# Patient Record
Sex: FEMALE | Race: WHITE | NOT HISPANIC OR LATINO | URBAN - METROPOLITAN AREA
[De-identification: names, ages, dates, MRNs, and addresses within clinical notes are randomized per-mention and may not be internally consistent; named-entity substitution may affect disease eponyms.]

---

## 2024-03-11 ENCOUNTER — OFFICE VISIT (OUTPATIENT)
Dept: URGENT CARE | Facility: CLINIC | Age: 49
End: 2024-03-11
Payer: COMMERCIAL

## 2024-03-11 VITALS
DIASTOLIC BLOOD PRESSURE: 62 MMHG | TEMPERATURE: 98 F | SYSTOLIC BLOOD PRESSURE: 109 MMHG | WEIGHT: 120 LBS | HEIGHT: 59 IN | RESPIRATION RATE: 14 BRPM | BODY MASS INDEX: 24.19 KG/M2 | OXYGEN SATURATION: 98 % | HEART RATE: 74 BPM

## 2024-03-11 DIAGNOSIS — Z77.098 EXPOSURE TO CHEMICAL INHALATION: Primary | ICD-10-CM

## 2024-03-11 PROCEDURE — 99204 OFFICE O/P NEW MOD 45 MIN: CPT | Performed by: PHYSICIAN ASSISTANT

## 2024-03-11 RX ORDER — LEVOTHYROXINE SODIUM 150 UG/1
150 TABLET ORAL DAILY
COMMUNITY
Start: 2023-11-27

## 2024-03-11 RX ORDER — BUPROPION HYDROCHLORIDE 150 MG/1
150 TABLET ORAL EVERY MORNING
COMMUNITY
Start: 2023-12-07

## 2024-03-11 RX ORDER — SERTRALINE HYDROCHLORIDE 50 MG/1
50 TABLET, FILM COATED ORAL DAILY
COMMUNITY
Start: 2024-02-17

## 2024-03-11 RX ORDER — TRAZODONE HYDROCHLORIDE 50 MG/1
50 TABLET ORAL NIGHTLY
COMMUNITY
Start: 2024-02-23

## 2024-03-11 ASSESSMENT — ENCOUNTER SYMPTOMS
CHEST TIGHTNESS: 1
FEVER: 0
FATIGUE: 0
SHORTNESS OF BREATH: 0

## 2024-03-11 ASSESSMENT — PAIN SCALES - GENERAL: PAINLEVEL: 1

## 2024-03-11 NOTE — PROGRESS NOTES
"Subjective   Patient ID: Audrey Dozier is a 48 y.o. female who presents for Chest Pain (\"Burning in lungs\" after boiling vinegar at home yesterday.).  Patient notes an irritated sensation when she is breathing.  Notes that symptoms are actually better than they were last night.  Concerned about possible injury given the exposure to the vinegar fumes.    Review of Systems   Constitutional:  Negative for fatigue and fever.   HENT:  Negative for congestion.    Respiratory:  Positive for chest tightness. Negative for shortness of breath.    Cardiovascular:  Negative for chest pain.   All other systems reviewed and are negative.      Objective   /62   Pulse 74   Temp 36.7 °C (98 °F) (Temporal)   Resp 14   Ht 1.499 m (4' 11\")   Wt 54.4 kg (120 lb)   LMP 02/15/2024 (Approximate)   SpO2 98%   BMI 24.24 kg/m²   Physical Exam  Constitutional:       General: She is not in acute distress.     Appearance: Normal appearance. She is not ill-appearing, toxic-appearing or diaphoretic.   HENT:      Head: Normocephalic and atraumatic.      Mouth/Throat:      Mouth: Mucous membranes are moist.      Pharynx: Oropharynx is clear.   Eyes:      Conjunctiva/sclera: Conjunctivae normal.   Cardiovascular:      Rate and Rhythm: Normal rate and regular rhythm.      Heart sounds: No murmur heard.  Pulmonary:      Effort: Pulmonary effort is normal. No respiratory distress.      Breath sounds: Normal breath sounds. No wheezing.   Musculoskeletal:         General: No swelling, tenderness, deformity or signs of injury. Normal range of motion.      Cervical back: Normal range of motion and neck supple.   Skin:     General: Skin is warm and dry.      Findings: No erythema or rash.   Neurological:      General: No focal deficit present.      Mental Status: She is alert and oriented to person, place, and time.      Gait: Gait normal.         Assessment/Plan   Problem List Items Addressed This Visit       Exposure to chemical " inhalation - Primary   Patient exposed to fumes of white vinegar at home yesterday.  On physical exam airways are clear pulse oximetry 98% no tachycardia no tachypnea.  Discussed with patient unlikely significant pneumonitis.  Likely some mild inflammation of the airway.  Recommending long steamy showers, cool-mist humidifier in the room when she is sleeping.  If there is any worsening do not hesitate to seek reevaluation but the amount of exposure is unlikely to cause any significant complications.  Discussed with patient in the future ventilating the area fully and perhaps even considering not boiling the vinegar as the acid itself will work without need of boiling if given time. Patient in no respiraotry distress today recommending monitoring for any worsening

## 2024-03-11 NOTE — PATIENT INSTRUCTIONS
Assessment/Plan   Problem List Items Addressed This Visit       Exposure to chemical inhalation - Primary   Patient exposed to fumes of white vinegar at home yesterday.  On physical exam airways are clear pulse oximetry 98% no tachycardia no tachypnea.  Discussed with patient unlikely significant pneumonitis.  Likely some mild inflammation of the airway.  Recommending long steamy showers, cool-mist humidifier in the room when she is sleeping.  If there is any worsening do not hesitate to seek reevaluation but the amount of exposure is unlikely to cause any significant complications.  Discussed with patient in the future ventilating the area fully and perhaps even considering not boiling the vinegar as the acid itself will work without need of boiling if given time

## 2025-01-03 ENCOUNTER — OFFICE VISIT (OUTPATIENT)
Dept: URGENT CARE | Age: 50
End: 2025-01-03
Payer: COMMERCIAL

## 2025-01-03 VITALS
DIASTOLIC BLOOD PRESSURE: 74 MMHG | OXYGEN SATURATION: 97 % | TEMPERATURE: 98.4 F | HEART RATE: 78 BPM | SYSTOLIC BLOOD PRESSURE: 107 MMHG | RESPIRATION RATE: 17 BRPM

## 2025-01-03 DIAGNOSIS — J32.0 RIGHT MAXILLARY SINUSITIS: ICD-10-CM

## 2025-01-03 DIAGNOSIS — J10.1 INFLUENZA A: Primary | ICD-10-CM

## 2025-01-03 DIAGNOSIS — R05.9 COUGH, UNSPECIFIED TYPE: ICD-10-CM

## 2025-01-03 LAB
POC RAPID INFLUENZA A: POSITIVE
POC RAPID INFLUENZA B: NEGATIVE
POC SARS-COV-2 AG BINAX: NORMAL

## 2025-01-03 RX ORDER — AMOXICILLIN AND CLAVULANATE POTASSIUM 875; 125 MG/1; MG/1
875 TABLET, FILM COATED ORAL 2 TIMES DAILY
Qty: 14 TABLET | Refills: 0 | Status: SHIPPED | OUTPATIENT
Start: 2025-01-03 | End: 2025-01-10

## 2025-01-03 RX ORDER — PROMETHAZINE HYDROCHLORIDE AND DEXTROMETHORPHAN HYDROBROMIDE 6.25; 15 MG/5ML; MG/5ML
5 SYRUP ORAL 4 TIMES DAILY PRN
Qty: 118 ML | Refills: 0 | Status: SHIPPED | OUTPATIENT
Start: 2025-01-03 | End: 2025-01-10

## 2025-01-03 ASSESSMENT — ENCOUNTER SYMPTOMS
CHILLS: 0
SHORTNESS OF BREATH: 0
ADENOPATHY: 0
COUGH: 1
HEADACHES: 0
FEVER: 0
CONFUSION: 0
ARTHRALGIAS: 0
WHEEZING: 0
SINUS PRESSURE: 1
NAUSEA: 0
VOMITING: 1
APNEA: 0
RHINORRHEA: 1
DIZZINESS: 0
PALPITATIONS: 0
SINUS PAIN: 1
CHOKING: 0
CHEST TIGHTNESS: 0
SORE THROAT: 1

## 2025-01-03 ASSESSMENT — PATIENT HEALTH QUESTIONNAIRE - PHQ9
2. FEELING DOWN, DEPRESSED OR HOPELESS: NOT AT ALL
1. LITTLE INTEREST OR PLEASURE IN DOING THINGS: NOT AT ALL
SUM OF ALL RESPONSES TO PHQ9 QUESTIONS 1 AND 2: 0

## 2025-01-03 NOTE — PATIENT INSTRUCTIONS
49-year-old with flu symptoms 5 days, now with maxillary sinusitis, treatment with Augmentin.  Persistent postviral cough recommend Promethazine DM.  Please continue with Tylenol or ibuprofen as needed for symptom control.

## 2025-01-03 NOTE — PROGRESS NOTES
"Subjective   Patient ID: Audrey Dozier \"Natalee" is a 49 y.o. female. They present today with a chief complaint of Vomiting, Nausea, sinus pressure, Nasal Congestion, Sore Throat, Cough, and chest congestion (X5 days).    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  She reports associated moderate to severe congestion headache, sore throat, postnasal drainage.      History provided by:  Patient   used: No    Vomiting  Associated symptoms: cough and sore throat    Associated symptoms: no arthralgias, no chills, no fever and no headaches    Sore Throat   Associated symptoms include congestion, coughing and vomiting. Pertinent negatives include no headaches or shortness of breath.   Cough  Associated symptoms include rhinorrhea and a sore throat. Pertinent negatives include no chest pain, chills, fever, headaches, shortness of breath or wheezing.       Past Medical History  Allergies as of 01/03/2025 - Reviewed 01/03/2025   Allergen Reaction Noted    Codeine Shortness of breath 03/11/2024    Sulfa (sulfonamide antibiotics) Hives 03/11/2024       (Not in a hospital admission)       No past medical history on file.    No past surgical history on file.     reports that she has never smoked. She has never used smokeless tobacco. She reports that she does not use drugs.    Review of Systems  Review of Systems   Constitutional:  Negative for chills and fever.   HENT:  Positive for congestion, rhinorrhea, sinus pressure, sinus pain and sore throat.    Respiratory:  Positive for cough. Negative for apnea, choking, chest tightness, shortness of breath and wheezing.    Cardiovascular:  Negative for chest pain and palpitations.   Gastrointestinal:  Positive for vomiting. Negative for nausea.   Musculoskeletal:  Negative for arthralgias.   Neurological:  " Negative for dizziness and headaches.   Hematological:  Negative for adenopathy.   Psychiatric/Behavioral:  Negative for confusion.                                   Objective    Vitals:    01/03/25 1711   BP: 107/74   BP Location: Left arm   Patient Position: Sitting   BP Cuff Size: Adult   Pulse: 78   Resp: 17   Temp: 36.9 °C (98.4 °F)   TempSrc: Oral   SpO2: 97%     Patient's last menstrual period was 02/15/2024 (approximate).    Physical Exam  Vitals and nursing note reviewed.   Constitutional:       General: She is not in acute distress.     Appearance: Normal appearance. She is not ill-appearing, toxic-appearing or diaphoretic.   HENT:      Right Ear: Tympanic membrane normal.      Left Ear: Tympanic membrane normal.      Nose: Congestion present. No rhinorrhea.      Comments: Tender right maxillary sinus     Mouth/Throat:      Pharynx: No oropharyngeal exudate or posterior oropharyngeal erythema.   Cardiovascular:      Rate and Rhythm: Normal rate and regular rhythm.      Pulses: Normal pulses.      Heart sounds: Normal heart sounds.   Pulmonary:      Effort: Pulmonary effort is normal.      Breath sounds: Normal breath sounds.   Musculoskeletal:      Cervical back: No tenderness.   Lymphadenopathy:      Cervical: No cervical adenopathy.   Neurological:      General: No focal deficit present.      Mental Status: She is alert and oriented to person, place, and time.   Psychiatric:         Mood and Affect: Mood normal.         Behavior: Behavior normal.         Procedures    Point of Care Test & Imaging Results from this visit  Results for orders placed or performed in visit on 01/03/25   POCT Covid-19 Rapid Antigen   Result Value Ref Range    POC MARIBELL-COV-2 AG  Presumptive negative test for SARS-CoV-2 (no antigen detected)     Presumptive negative test for SARS-CoV-2 (no antigen detected)   POCT Influenza A/B manually resulted   Result Value Ref Range    POC Rapid Influenza A Positive (A) Negative    POC Rapid  Influenza B Negative Negative      No results found.    Diagnostic study results (if any) were reviewed by Marybeth Lewis.    Assessment/Plan   Allergies, medications, history, and pertinent labs/EKGs/Imaging reviewed by Marybeth Lewis.     Medical Decision Making  49-year-old with flu symptoms 5 days, now with maxillary sinusitis, treatment with Augmentin.  Persistent postviral cough recommend Promethazine DM.  Please continue with Tylenol or ibuprofen as needed for symptom control.    Orders and Diagnoses  Diagnoses and all orders for this visit:  Influenza A  Cough, unspecified type  -     POCT Covid-19 Rapid Antigen  -     POCT Influenza A/B manually resulted  -     promethazine-DM (Phenergan-DM) 6.25-15 mg/5 mL syrup; Take 5 mL by mouth 4 times a day as needed for cough for up to 7 days.  Right maxillary sinusitis  -     amoxicillin-pot clavulanate (Augmentin) 875-125 mg tablet; Take 1 tablet (875 mg) by mouth 2 times a day for 7 days.      Medical Admin Record      Patient disposition: Home    Electronically signed by Marybeth Lewis  8:23 AM

## 2025-02-24 ENCOUNTER — HOSPITAL ENCOUNTER (EMERGENCY)
Facility: HOSPITAL | Age: 50
Discharge: OTHER INSTITUTION | End: 2025-02-24

## 2025-02-24 PROCEDURE — 4500999001 HC ED NO CHARGE
